# Patient Record
Sex: FEMALE | Race: WHITE | NOT HISPANIC OR LATINO | URBAN - METROPOLITAN AREA
[De-identification: names, ages, dates, MRNs, and addresses within clinical notes are randomized per-mention and may not be internally consistent; named-entity substitution may affect disease eponyms.]

---

## 2017-04-05 ENCOUNTER — APPOINTMENT (RX ONLY)
Dept: URBAN - METROPOLITAN AREA CLINIC 43 | Facility: CLINIC | Age: 62
Setting detail: DERMATOLOGY
End: 2017-04-05

## 2017-04-05 DIAGNOSIS — L81.4 OTHER MELANIN HYPERPIGMENTATION: ICD-10-CM

## 2017-04-05 DIAGNOSIS — L27.0 GENERALIZED SKIN ERUPTION DUE TO DRUGS AND MEDICAMENTS TAKEN INTERNALLY: ICD-10-CM

## 2017-04-05 PROBLEM — F41.9 ANXIETY DISORDER, UNSPECIFIED: Status: ACTIVE | Noted: 2017-04-05

## 2017-04-05 PROBLEM — I10 ESSENTIAL (PRIMARY) HYPERTENSION: Status: ACTIVE | Noted: 2017-04-05

## 2017-04-05 PROBLEM — M12.9 ARTHROPATHY, UNSPECIFIED: Status: ACTIVE | Noted: 2017-04-05

## 2017-04-05 PROBLEM — L30.9 DERMATITIS, UNSPECIFIED: Status: ACTIVE | Noted: 2017-04-05

## 2017-04-05 PROBLEM — F32.9 MAJOR DEPRESSIVE DISORDER, SINGLE EPISODE, UNSPECIFIED: Status: ACTIVE | Noted: 2017-04-05

## 2017-04-05 PROCEDURE — ? BIOPSY BY PUNCH METHOD

## 2017-04-05 PROCEDURE — ? PRESCRIPTION

## 2017-04-05 PROCEDURE — 11100: CPT

## 2017-04-05 PROCEDURE — 11101: CPT

## 2017-04-05 PROCEDURE — ? COUNSELING

## 2017-04-05 PROCEDURE — 99202 OFFICE O/P NEW SF 15 MIN: CPT | Mod: 25

## 2017-04-05 RX ORDER — HYDROCORTISONE 25 MG/G
OINTMENT TOPICAL
Qty: 1 | Refills: 0 | Status: ERX | COMMUNITY
Start: 2017-04-05

## 2017-04-05 RX ORDER — HALCINONIDE CREAM 1 MG/G
CREAM TOPICAL
Qty: 1 | Refills: 0 | Status: ERX | COMMUNITY
Start: 2017-04-05

## 2017-04-05 RX ADMIN — HALCINONIDE CREAM: 1 CREAM TOPICAL at 00:00

## 2017-04-05 RX ADMIN — HYDROCORTISONE: 25 OINTMENT TOPICAL at 00:00

## 2017-04-05 ASSESSMENT — LOCATION ZONE DERM
LOCATION ZONE: TRUNK
LOCATION ZONE: TRUNK
LOCATION ZONE: ARM

## 2017-04-05 ASSESSMENT — LOCATION SIMPLE DESCRIPTION DERM
LOCATION SIMPLE: LEFT FOREARM
LOCATION SIMPLE: ABDOMEN
LOCATION SIMPLE: ABDOMEN
LOCATION SIMPLE: RIGHT FOREARM

## 2017-04-05 ASSESSMENT — LOCATION DETAILED DESCRIPTION DERM
LOCATION DETAILED: LEFT PROXIMAL DORSAL FOREARM
LOCATION DETAILED: LEFT LATERAL ABDOMEN
LOCATION DETAILED: RIGHT LATERAL ABDOMEN
LOCATION DETAILED: RIGHT PROXIMAL DORSAL FOREARM
LOCATION DETAILED: LEFT LATERAL ABDOMEN

## 2017-04-05 NOTE — PROCEDURE: BIOPSY BY PUNCH METHOD
Suture Removal: 9 days
X Size Of Lesion In Cm (Optional): 0
Bill [id number] For Specimen Handling/Conveyance To Laboratory?: no
Epidermal Sutures: 4-0 Ethilon
Anesthesia Type: 2% lidocaine with epinephrine and a 1:10 solution of 8.4% sodium bicarbonate
Detail Level: Detailed
Hemostasis: None
Post-Care Instructions: I reviewed with the patient in detail post-care instructions.  1. The biopsy site will be numb for about 2 hours, after that you may experience slight discomfort, typically not requiring pain medication. 2.  Leave the bandage we applied on overnight and keep the wound dry.  After removing the bandage the next morning, you may shower or bathe. 3.  Apply mupirocin ointment and a bandage daily to the wound until it is healed. (typically one to two weeks). Avoid crusting or scabs to the biopsy site as this slows healing. 4.If bleeding occurs, apply firm pressure to the wound for 15 minutes.  Then apply a clean bandage. 5. If you have persistent bleeding, swelling, pain,or drainage from the wound, phone the office. If your wound was sutured return to the office for suture removal at the appointed time.
Anesthesia Volume In Cc: 1.2
Consent: Verbal consent was obtained and risks were reviewed including but not limited to scarring, infection, bleeding, scabbing, incomplete removal, nerve damage and allergy to anesthesia.
Wound Care: Vaseline
Punch Size In Mm: 4
Dressing: pressure dressing
Body Location Override (Optional - Billing Will Still Be Based On Selected Body Map Location If Applicable): Right abdomen
Billing Type: Third-Party Bill
Home Suture Removal Text: Patient was provided a home suture removal kit and will remove their sutures at home.  If they have any questions or difficulties they will call the office.
Lab: [id number]
Accession #: 
Biopsy Type: H and E
Accession #: 
Body Location Override (Optional - Billing Will Still Be Based On Selected Body Map Location If Applicable): Left thorax

## 2017-04-06 ENCOUNTER — RX ONLY (OUTPATIENT)
Age: 62
Setting detail: RX ONLY
End: 2017-04-06

## 2017-04-06 RX ORDER — CLOBETASOL PROPIONATE 0.5 MG/G
OINTMENT TOPICAL
Qty: 3 | Refills: 0 | Status: ERX | COMMUNITY
Start: 2017-04-06

## 2017-04-13 ENCOUNTER — APPOINTMENT (RX ONLY)
Dept: URBAN - METROPOLITAN AREA CLINIC 43 | Facility: CLINIC | Age: 62
Setting detail: DERMATOLOGY
End: 2017-04-13

## 2017-04-13 DIAGNOSIS — L20.89 OTHER ATOPIC DERMATITIS: ICD-10-CM | Status: IMPROVED

## 2017-04-13 DIAGNOSIS — L57.0 ACTINIC KERATOSIS: ICD-10-CM

## 2017-04-13 PROBLEM — L30.9 DERMATITIS, UNSPECIFIED: Status: ACTIVE | Noted: 2017-04-13

## 2017-04-13 PROCEDURE — ? PRESCRIPTION

## 2017-04-13 PROCEDURE — ? TREATMENT REGIMEN

## 2017-04-13 PROCEDURE — 17000 DESTRUCT PREMALG LESION: CPT

## 2017-04-13 PROCEDURE — ? LIQUID NITROGEN

## 2017-04-13 PROCEDURE — 99213 OFFICE O/P EST LOW 20 MIN: CPT | Mod: 25

## 2017-04-13 RX ORDER — HYDROXYZINE HYDROCHLORIDE 10 MG/1
TABLET, FILM COATED ORAL
Qty: 20 | Refills: 0 | Status: ERX | COMMUNITY
Start: 2017-04-13

## 2017-04-13 RX ADMIN — HYDROXYZINE HYDROCHLORIDE: 10 TABLET, FILM COATED ORAL at 00:00

## 2017-04-13 ASSESSMENT — LOCATION DETAILED DESCRIPTION DERM: LOCATION DETAILED: RIGHT INFERIOR CENTRAL MALAR CHEEK

## 2017-04-13 ASSESSMENT — LOCATION ZONE DERM: LOCATION ZONE: FACE

## 2017-04-13 ASSESSMENT — LOCATION SIMPLE DESCRIPTION DERM: LOCATION SIMPLE: RIGHT CHEEK

## 2017-04-13 NOTE — PROCEDURE: TREATMENT REGIMEN
Continue Regimen: Clobetasol
Detail Level: Zone
Plan: Recommend fu with Emerita Castillo MD for patch testing

## 2017-05-03 ENCOUNTER — APPOINTMENT (RX ONLY)
Dept: URBAN - METROPOLITAN AREA CLINIC 43 | Facility: CLINIC | Age: 62
Setting detail: DERMATOLOGY
End: 2017-05-03

## 2017-05-03 DIAGNOSIS — L20.89 OTHER ATOPIC DERMATITIS: ICD-10-CM | Status: INADEQUATELY CONTROLLED

## 2017-05-03 PROBLEM — L30.9 DERMATITIS, UNSPECIFIED: Status: ACTIVE | Noted: 2017-05-03

## 2017-05-03 PROCEDURE — ? OTHER

## 2017-05-03 PROCEDURE — 99213 OFFICE O/P EST LOW 20 MIN: CPT

## 2017-05-03 PROCEDURE — ? COUNSELING

## 2017-05-03 PROCEDURE — ? TREATMENT REGIMEN

## 2017-05-03 PROCEDURE — ? PRESCRIPTION

## 2017-05-03 RX ORDER — PREDNISONE 10 MG/1
TABLET ORAL
Qty: 29 | Refills: 0 | Status: ERX | COMMUNITY
Start: 2017-05-03

## 2017-05-03 RX ADMIN — PREDNISONE: 10 TABLET ORAL at 19:34

## 2017-05-03 NOTE — PROCEDURE: OTHER
Detail Level: Generalized
Note Text (......Xxx Chief Complaint.): This diagnosis correlates with the
Other (Free Text): Discussed treatment options. Reviewed prednisone risks and sigs. Patient wishes to proceed with Prednisone